# Patient Record
Sex: FEMALE | Race: BLACK OR AFRICAN AMERICAN | NOT HISPANIC OR LATINO | Employment: PART TIME | URBAN - METROPOLITAN AREA
[De-identification: names, ages, dates, MRNs, and addresses within clinical notes are randomized per-mention and may not be internally consistent; named-entity substitution may affect disease eponyms.]

---
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
## 2022-06-10 ENCOUNTER — OFFICE VISIT (OUTPATIENT)
Dept: FAMILY MEDICINE | Facility: CLINIC | Age: 22
End: 2022-06-10
Payer: COMMERCIAL

## 2022-06-10 VITALS
DIASTOLIC BLOOD PRESSURE: 60 MMHG | HEART RATE: 80 BPM | SYSTOLIC BLOOD PRESSURE: 104 MMHG | OXYGEN SATURATION: 99 % | HEIGHT: 62 IN | WEIGHT: 198 LBS | BODY MASS INDEX: 36.44 KG/M2

## 2022-06-10 DIAGNOSIS — Z00.00 ROUTINE GENERAL MEDICAL EXAMINATION AT A HEALTH CARE FACILITY: Primary | ICD-10-CM

## 2022-06-10 DIAGNOSIS — E66.01 MORBID OBESITY (H): ICD-10-CM

## 2022-06-10 PROCEDURE — 99395 PREV VISIT EST AGE 18-39: CPT | Performed by: NURSE PRACTITIONER

## 2022-06-10 ASSESSMENT — ENCOUNTER SYMPTOMS
NAUSEA: 0
DIARRHEA: 0
FEVER: 0
NERVOUS/ANXIOUS: 0
DYSURIA: 0
CHILLS: 0
DIZZINESS: 0
HEMATOCHEZIA: 0
HEARTBURN: 0
SHORTNESS OF BREATH: 0
COUGH: 0
ARTHRALGIAS: 0
FREQUENCY: 0
PARESTHESIAS: 0
HEMATURIA: 0
MYALGIAS: 0
CONSTIPATION: 0
EYE PAIN: 0
PALPITATIONS: 0
WEAKNESS: 0
JOINT SWELLING: 0
BREAST MASS: 0
SORE THROAT: 0
ABDOMINAL PAIN: 0
HEADACHES: 0

## 2022-06-10 NOTE — PROGRESS NOTES
SUBJECTIVE:   CC: Brooke Murcia is an 22 year old woman who presents for preventive health visit.     Patient is living at home with her foster parents.  She just finished a 2-year program at [employer].  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  She is also working part-time at a senior living facility.    Patient denies any chronic medical conditions.  She is not on any regular medications.    She does report that she has episodes of feeling sad and anxious.  This seems to come and go.  She does not wish to discuss this more today.    She is interested in a referral to meet with a dietitian.  She is morbidly obese.  She had labs completed almost 2 years ago, which were unremarkable for diabetes or high cholesterol.  She generally eats well-rounded, but there is room for improvement.  Not exercising regularly currently.    Patient has been advised of split billing requirements and indicates understanding: Yes  Healthy Habits:     Getting at least 3 servings of Calcium per day:  Yes    Bi-annual eye exam:  Yes    Dental care twice a year:  NO    Sleep apnea or symptoms of sleep apnea:  None    Diet:  Regular (no restrictions)    Frequency of exercise:  1 day/week    Duration of exercise:  Less than 15 minutes    Taking medications regularly:  Yes    Medication side effects:  None    PHQ-2 Total Score: 1    Additional concerns today:  No      Today's PHQ-2 Score:   PHQ-2 ( 1999 [employer]) 6/10/2022   Q1: Little interest or pleasure in doing things 1   Q2: Feeling down, depressed or hopeless 0   PHQ-2 Score 1   Q1: Little interest or pleasure in doing things Several days   Q2: Feeling down, depressed or hopeless Not at all   PHQ-2 Score 1       Abuse: Current or Past (Physical, Sexual or Emotional) - No  Do you feel safe in your environment? YES    Have you ever done Advance Care Planning? (For example, a Health Directive, POLST, or a discussion with a medical provider or your loved ones  "about your wishes): No, advance care planning information given to patient to review.  Patient declined advance care planning discussion at this time.    Social History     Tobacco Use     Smoking status: Never Smoker     Smokeless tobacco: Never Used     Tobacco comment: pt tried smoking once d/t peer pressure; Foster father smokes outside.   Substance Use Topics     Alcohol use: No     Alcohol/week: 0.0 standard drinks       Alcohol Use 6/10/2022   Prescreen: >3 drinks/day or >7 drinks/week? Not Applicable       Reviewed orders with patient.  Reviewed health maintenance and updated orders accordingly - Yes      Breast Cancer Screening:      History of abnormal Pap smear: NO - under age 21, PAP not appropriate for age     Reviewed and updated as needed this visit by clinical staff   Tobacco  Allergies  Meds  Problems  Med Hx  Surg Hx  Fam Hx            Reviewed and updated as needed this visit by Provider   Tobacco  Allergies  Meds  Problems  Med Hx  Surg Hx  Fam Hx               Review of Systems  Pertinent items in HPI     OBJECTIVE:   /60   Pulse 80   Ht 1.577 m (5' 2.1\")   Wt 89.8 kg (198 lb)   LMP 05/25/2022 (Approximate)   SpO2 99%   BMI 36.10 kg/m    Physical Exam  GENERAL: healthy, alert and no distress  EYES: Eyes grossly normal to inspection and strabismus  HENT: ear canals and TM's normal, nose and mouth without ulcers or lesions  NECK: no adenopathy, no asymmetry, masses, or scars and thyroid normal to palpation  RESP: lungs clear to auscultation - no rales, rhonchi or wheezes  CV: regular rate and rhythm, normal S1 S2, no S3 or S4, no murmur, click or rub, no peripheral edema  ABDOMEN: soft, nontender, no hepatosplenomegaly, no masses and bowel sounds normal  MS: no gross musculoskeletal defects noted, no edema  SKIN: no suspicious lesions or rashes  NEURO: Normal strength and tone, mentation intact and speech normal  PSYCH: mentation appears normal, affect " "normal/bright      ASSESSMENT/PLAN:   Brooke was seen today for establish care.    Diagnoses and all orders for this visit:    Routine general medical examination at a health care facility    Morbid obesity (H)  -     Comprehensive Weight Management; Future    Other orders  -     REVIEW OF HEALTH MAINTENANCE PROTOCOL ORDERS          COUNSELING:  Reviewed preventive health counseling, as reflected in patient instructions    Estimated body mass index is 36.1 kg/m  as calculated from the following:    Height as of this encounter: 1.577 m (5' 2.1\").    Weight as of this encounter: 89.8 kg (198 lb).    Weight management plan: Patient referred to endocrine and/or weight management specialty Discussed healthy diet and exercise guidelines    She reports that she has never smoked. She has never used smokeless tobacco.        Nicole Mckinnon NP  [facility]  "

## 2022-06-15 ENCOUNTER — TELEPHONE (OUTPATIENT)
Dept: ENDOCRINOLOGY | Facility: CLINIC | Age: 22
End: 2022-06-15
Payer: COMMERCIAL

## 2022-06-15 NOTE — TELEPHONE ENCOUNTER
----- Message from Lien Peraza RD sent at 6/14/2022  4:32 PM CDT -----  Regarding: RE: New MWM program referral  Scheduling team - could you please reach out to pt to get her set up with an initial MWM visit for MD and RD.    Thanks for the heads Lo!    Lien  ----- Message -----  From: Lo Coyne RD  Sent: 6/14/2022   3:23 PM CDT  To: Lien Peraza RD  Subject: New MWM program referral                         León Emmanuel,     This patient just called me to set up an appt with an RD.  She is not sure how she got my number.   She was referred to Comprehensive Weight Management Program on 6/10/22 by Nicole Mckinnon NP.   I explained to her who I am and what I can provide her vs what she was referred for. She would like to proceed with weight management program with her referral.     Do you have a contact for someone to reach out to her to get her scheduled?    Thank you!    Lo

## 2022-09-12 ENCOUNTER — TELEPHONE (OUTPATIENT)
Dept: SURGERY | Facility: CLINIC | Age: 22
End: 2022-09-12

## 2022-09-12 NOTE — TELEPHONE ENCOUNTER
KIRA Cox to complete Patient Questionnaires via PastBook before visit with  on 9/13/2022     Malina Leon

## 2022-09-13 ENCOUNTER — OFFICE VISIT (OUTPATIENT)
Dept: SURGERY | Facility: CLINIC | Age: 22
End: 2022-09-13
Attending: NURSE PRACTITIONER
Payer: COMMERCIAL

## 2022-09-13 VITALS
WEIGHT: 200.9 LBS | DIASTOLIC BLOOD PRESSURE: 64 MMHG | BODY MASS INDEX: 36.97 KG/M2 | SYSTOLIC BLOOD PRESSURE: 118 MMHG | HEIGHT: 62 IN

## 2022-09-13 DIAGNOSIS — R53.83 OTHER FATIGUE: ICD-10-CM

## 2022-09-13 DIAGNOSIS — E66.01 MORBID OBESITY (H): Primary | ICD-10-CM

## 2022-09-13 PROCEDURE — 99214 OFFICE O/P EST MOD 30 MIN: CPT | Performed by: FAMILY MEDICINE

## 2022-09-13 NOTE — LETTER
"    2022         RE: Brooke Murcia  [address]  [place] [address]        Dear Colleague,    Thank you for referring your patient, Brooke Murcia, to the [facility] [place]. Please see a copy of my visit note below.        New Medical Weight Management Consult    PATIENT:  Brooke Murcia  MRN:         [id number]  :         2000  CODIE:         2022    Dear NP Nicole Mckinnon,    I had the pleasure of seeing your patient, Brooke Murcia. Full intake/assessment was done to determine barriers to weight loss success and develop a treatment plan. Brooke Murcia is a 22 year old female interested in treatment of medical problems associated with excess weight. She has a height of 5' 2\", a weight of 200 lbs 14.4 oz, and the calculated Body mass index is 36.75 kg/m .    ASSESSMENT/PLAN:  Some soda  Snacks  Less than lean protein  Inconsistent physical activity    Dietitian  Labs  Discussed taking a walk a day whether outside, mall, or other types of \"move\" that would be enjoyable for her  Encouraged her to invite her mother to sit in on the dietitian visit as she does the shopping and cooking    We discussed Bariatric Basics including:  -eating 3 meals daily  -eating protein first  -eating slowly, chewing food well  -avoiding/limiting calorie containing beverages  -choosing wheat, not white with breads, crackers, pastas, brittany, bagels, tortillas, rice  -limiting restaurant or cafeteria eating to twice a week or less    We discussed the importance of restorative sleep and stress management in maintaining a healthy weight.    We reviewed medications associated with weight gain.    We discussed insulin resistance and glycemic index as it relates to appetite and weight control.     We discussed the National Weight Control Registry healthy weight maintenance strategies and ways to optimize metabolism.  We discussed the importance of physical activity " "including cardiovascular and strength training in maintaining a healthier weight and explored viable options.    We discussed medications available for weight loss including Phentermine, Phendimetrazine, Topamax, Qsymia, Diethylproprion, Orlistat, Contrave (Bupropion/Naltrexone), Saxenda (Liraglutide), Wegovy (Semaglutide), Dulaglutide, Mounjaro (Tirzepatide) and Vyvanse (for Binge Eating Disorder). We discussed the risks and benefits of each. We discussed indications, contraindications, potential side effects, and estimated costs of each. Literature was provided. Brooke understands that not using a weight loss medication is an option.      She has the following co-morbidities:       9/12/2022   I have the following health issues associated with obesity: None of the above   I have the following symptoms associated with obesity: Fatigue       Patient Goals 9/12/2022   I am interested in having a healthier weight to diminish current health problems: No   I am interested in having a healthier weight in order to prevent future health problems: Yes   I am interested in having a healthier weight in order to have a future surgery: No       Referring Provider 9/12/2022   Please name the provider who referred you to Medical Weight Management.  If you do not know, please answer: \"I Don't Know\". Nicole Ino       Weight History 9/12/2022   How concerned are you about your weight? Very Concerned   Would you describe your weight gain as gradual? Yes   I became overweight: As a Teenager   The following factors have contributed to my weight gain:  Mental Health Issues, Change in Schedule, Eating Wrong Types of Food, Eating Too Much, Lack of Exercise, Genetic (Runs in the Family), Stress, Other   Please list the other factors.  Possibly when I broke my ankle in 2019   I have tried the following methods to lose weight: Watching Portions or Calories, Exercise   My lowest weight since age 20 was: 189   My highest weight since age 20 " was: 204   The most weight I have ever lost was: (lbs) 3   I have the following family history of obesity/being overweight:  My mother is overweight, One or more of my siblings are overweight, Many of my relatives are overweight   Has anyone in your family had weight loss surgery? No   How has your weight changed over the last year?  Gained   How many pounds? 5       Diet Recall Review with Patient 9/12/2022   Do you typically eat breakfast? Yes   If you do eat breakfast, what types of food do you eat? Cereal, yogurt, toast, oatmeal   Do you typically eat lunch? Yes   If you do eat lunch, what types of food do you typically eat?  Leftovers, sandwich, chips, fruit   Do you typically eat supper? Yes   If you do eat supper, what types of food do you typically eat? Burgers/brats, Chicken, Rice, tator tot hotdish, salads on the side, tacos, breakfast   Do you typically eat snacks? Yes   If you do snack, what types of food do you typically eat? chips, popcorn, fruit, sweets   Do you like vegetables?  Yes   Do you drink water? Yes   How many glasses of juice do you drink in a typical day? 0   How many of glasses of milk do you drink in a typical day? 1   If you do drink milk, what type? N/A   How many 8oz glasses of sugar containing drinks such as Cristopher-Aid/sweet tea do you drink in a day? 1   How many cans/bottles of sugar pop/soda/tea/sports drinks do you drink in a day? 0   How many cans/bottles of diet pop/soda/tea or sports drink do you drink in a day? 0   How often do you have a drink of alcohol? Never       Eating Habits 9/12/2022   Generally, my meals include foods like these: bread, pasta, rice, potatoes, corn, crackers, sweet dessert, pop, or juice. A Few Times a Week   Generally, my meals include foods like these: fried meats, brats, burgers, french fries, pizza, cheese, chips, or ice cream. A Few Times a Week   Eat fast food (like McDonal[employer], [employer] [place], Taco Bell). Once a Week   Eat at a buffet or sit-down  restaurant. Less Than Weekly   Eat most of my meals in front of the TV or computer. Less Than Weekly   Often skip meals, eat at random times, have no regular eating times. Less Than Weekly   Rarely sit down for a meal but snack or graze throughout.  Less Than Weekly   Eat extra snacks between meals. A Few Times a Week   Eat most of my food at the end of the day. Never   Eat in the middle of the night or wake up at night to eat. Never   Eat extra snacks to prevent or correct low blood sugar. Never   Eat to prevent acid reflux or stomach pain. Never   Worry about not having enough food to eat. A Few Times a Week   Have you been to the food shelf at least a few times this year? No   I eat when I am depressed. Less Than Weekly   I eat when I am stressed. Once a Week   I eat when I am bored. Almost Everyday   I eat when I am anxious. Less Than Weekly   I eat when I am happy or as a reward. A Few Times a Week   I feel hungry all the time even if I just have eaten. A Few Times a Week   Feeling full is important to me. Once a Week   I finish all the food on my plate even if I am already full. A Few Times a Week   I can't resist eating delicious food or walk past the good food/smell. Less Than Weekly   I eat/snack without noticing that I am eating. Less Than Weekly   I eat when I am preparing the meal. Less Than Weekly   I eat more than usual when I see others eating. A Few Times a Week   I have trouble not eating sweets, ice cream, cookies, or chips if they are around the house. Everyday   I think about food all day. Almost Everyday   What foods, if any, do you crave? Sweets/Candy/Chocolate   Please list any other foods you crave? Starbucks coffee, chocolate, hot cheetos, takis       Amount of Food 9/12/2022   I make myself vomit what I have eaten or use laxatives to get rid of food. Never   I eat a large amount of food, like a loaf of bread, a box of cookies, a pint/quart of ice cream, all at once. Never   I eat a large  amount of food even when I am not hungry. Monthly   I eat rapidly. Almost Everyday   I eat alone because I feel embarrassed and do not want others to see how much I have eaten. Monthly   I eat until I am uncomfortably full. Monthly   I feel bad, disgusted, or guilty after I overeat. Weekly   I make myself vomit what I have eaten or use laxatives to get rid of food. Never       Activity/Exercise History 9/12/2022   How much of a typical 12 hour day do you spend sitting? Most of the Day   How much of a typical 12 hour day do you spend lying down? Half the Day   How much of a typical day do you spend walking/standing? Half the Day   How many hours (not including work) do you spend on the TV/Video Games/Computer/Tablet/Phone? 6 Hours or More   How many times a week are you active for the purpose of exercise? Once a Week   What keeps you from being more active? Lack of Time, Too tired, Unsure What To Do   How many total minutes do you spend doing some activity for the purpose of exercising when you exercise? 15-30 Minutes       PAST MEDICAL HISTORY:  Past Medical History:   Diagnosis Date     Acne vulgaris 05/07/2016     Adjustment disorder with depressed mood 03/09/2016     Dyshidrotic eczema 03/09/2016     Other fatigue      Strabismus 03/09/2016       Work/Social History Reviewed With Patient 9/12/2022   My employment status is: Part-Time, Student   My job is: -senior living   How much of your job is spent on the computer or phone? Less Than 50%   How many hours do you spend commuting to work daily?  5 minutes   What is your marital status? Single   Do you have children? No   Who do you live with?  Siblings (2 sisters, 1 brother) Adoptive Parents   Are they supportive of your health goals? Yes   Who does the food shopping?  Parents, sometimes me       Mental Health History Reviewed With Patient 9/12/2022   Have you ever been physically or sexually abused? No   If yes, do you feel that the abuse is  "affecting your weight? No   If yes, would you like to talk to a counselor about the abuse? No   How often in the past 2 weeks have you felt little interest or pleasure in doing things? More Than Half the Days   Over the past 2 weeks how often have you felt down, depressed, or hopeless? For Several Days       Sleep History Reviewed With Patient 9/12/2022   How many hours do you sleep at night? 8   Do you think that you snore loudly or has anybody ever heard you snore loudly (louder than talking or so loud it can be heard behind a shut door)? No   Has anyone seen or heard you stop breathing during your sleep? No   Do you often feel tired, fatigued, or sleepy during the day? Yes   Do you have a TV/Computer in your bedroom? Yes       MEDICATIONS:   No current outpatient medications on file.       ALLERGIES:   No Known Allergies    PHYSICAL EXAM:  /64 (BP Location: Right arm, Patient Position: Sitting, Cuff Size: Adult Small)   Ht 1.575 m (5' 2\")   Wt 91.1 kg (200 lb 14.4 oz)   BMI 36.75 kg/m     Right exotropia  Mallampati 3+, Neck  Heart Regular  Lungs Clear    FOLLOW-UP:   As above.    TIME: 40 min spent on evaluation, management, counseling, education, & motivational interviewing with greater than 50 % of the total time was spent on counseling and coordinating care    Sincerely,    Ratna Hernandez MD          Again, thank you for allowing me to participate in the care of your patient.        Sincerely,        Ratna Hernandez MD    "

## 2022-09-13 NOTE — PATIENT INSTRUCTIONS
[facility]    Remember to: invite your mom to sit in on the dietitian appointments    -Eat 3 meals a day (not 2, not 5) Chew your food well/SLOW down  -Eat your protein first  -Be a water drinker/Minize liquid calories (no regular pop, no juice) skim or 1% milk OK  -Sleep 7-8 hours each night. Address sleep if problematic  -Stress management is important. Address if problematic  -Move-8000 steps daily Muscle: maintain your muscle mass (strength training 2X/wk)  -Wheat, not white (bread, pasta, crackers, brittany, bagels, tortillas, rice)  -Limit restaurant, cafeteria, take out, drive through to 2 times per week or less  -Minimize caffeine, alcohol, and night-time snacking  -Consider keeping a food diary (i.e. My Fitness Pal, Lose It, or other food tracker)  -Follow up with the dietitian      **Some lean proteins: chicken, turkey, tuna, salmon, crab, fish, shrimp, scallops, lobster, lean cuts of beef and pork, luncheon meats, veggie burgers, beans (black, lima, garbanzo, barker, kidney, refried), chile, cottage cheese, string cheese, other cheese, eggs, tofu, peanut butter, nuts, vegan crumbles, greek yogurt    MEDICATIONS FOR WEIGHT LOSS    PHENTERMINE (Adipex): approved in 1959 for appetite suppression.  It has stimulant effects and cannot be used with Ritalin, Concerta, or other stimulants.  It is not addictive although it's chemically related to amphetamines.  Amphetamines are addictive. The most common side effects are dry mouth, increased energy and concentration, increased pulse, and constipation.  You should not take phentermine if you have glaucoma, hyperthyroidism, or uncontrolled/untreated hypertension.  $24-$30 for 90 tablets    PHENDIMETRAZINE (Bontril): Appetite suppressant/sympathomimetic.  Controlled substance.  Side effects and contraindications similar to phentermine.  $45-$60 for 3 month supply    TOPIRAMATE (Topamax): Anti-seizure medication, also used to prevent migraines.  Side  effects include paresthesia, glaucoma, altered concentration, attention difficulties, memory and speech problems, metabolic acidosis, depression, increase in body temperature and decrease sweating, kidney stones, and weight loss.  Do not take Topamax while taking Depakote as this can cause high ammonia levels.  You must have reliable birth control as Topamax can cause birth defects.  Discontinue slowly to avoid seizure.  Insurance usually covers Topiramate.    QSYMIA (Phentermine + Topamax):  See above information about phentermine and Topamax.  Most common side effects are paresthesia, dizziness, distortion of taste, insomnia, constipation, and dry mouth.  $150-$220 per month    DIETHYLPROPION (Tenuate): Sympathomimetic amine.  Appetite suppressant.  Doses 25 mg before meals or 75 mg per day.  Most common side effects are hypertension, palpitations, EKG changes, and increased seizures in epileptics.  There can be a possible adverse reaction with alcohol.  $70-$90 per 3 months    XENICAL(Orlistat) (Real OTC): Approved in 1999.  A fat-blocker.  It reduces absorption of fat by approximately 30%.  It has beneficial effects on lipid levels.  Side effects include diarrhea, abdominal cramping, fecal incontinence, oily spotting, and flatus with discharge.  Side effects are minimized if the patient limits their dietary fat to no more than 30% of their diet.  Patients must take a multivitamin daily to avoid vitamin D, E, A, and K deficiency.  $120 per month    CONTRAVE (Naltrexone/Bupropion): Approved in 2014.  It is a combination pill including an opioid receptor blocker and a long-standing antidepressant.  Most common side effects include nausea, constipation, headache, vomiting, dizziness, trouble sleeping, dry mouth, and diarrhea.  With all antidepressants watch for mood changes and suicide ideation.  Bupropion has been known to lower the seizure threshold in those prone to seizures.  It should not be used in a patient  with a recent history of bulimia. It has been associated with liver damage from taking higher than recommended doses.  Do not use countrave if you have taken opioid medications or opioid street drugs in the past 7-10 days, if you are currently on opioids, methadone, or if you are pregnant.  Do not use contrave if you have recently stopped using alcohol or benzodiazepines.  Taper off contrave slowly.  Dosing: titrate up to 2 tablets twice daily of the Naltrexone 8 mg/ Bupropion 90 mg tablets.  $200 for 90 tablets    SAXENDA (Liraglutide): A daily injectable (3mg daily) medication used for type 2 diabetes (Victoza). Glucagon-like peptide-1 (GLP-1) agonist. Contraindications include personal or family history of medullary thyroid cancer or MEN type 2. Acute pancreatitis has been observed in patients taking liraglutide. Liraglutide causes C-cell tumors in rats and mice. It is unknown whether liraglutide causes tumors in humans. Start at 0.6mg, increasing the dose weekly up to 3mg.     TRULICITY (Dulaglutide): A weekly injectable (4.5mg weekly) medication used for type 2 diabetes. Glucagon-like peptide-1(GLP-1) agonist. Contraindications include personal or family history of medullary thyroid cancer or MEN type 2. Acute pancreatitis has been observed in patients taking liraglutide. Dulaglutide causes C-cell tumors in rats and mice. It is unknown whether liraglutide causes tumors in humans. Start at 0.75 mg, 1.5 mg, 3.0 mg, to 4.5 mg increasing the dose monthly.      VYVANSE (Lisdexamfetamine dimesylate): a CNS stimulant used to treat ADHD. Indicated for the treatment of moderate to severe Binge Eating Disorder in Adults. Contraindicated in patients with known heart disease, structural abnormalities of the heart, serious heart arrhythmias or unexplained syncope. CNS stimulants such as vyvanse may cause manic or psychotic symptoms in patients with BPAD or pre-existing psychosis. Use with caution in patients with Raynaud's  phenomenon. Most common side effects include dry mouth, insomnia, decreased appetite, increased heart rate, jittery feeling, constipation and anxiety.     WEGOVY (Semaglutide): A weekly injectable (2.4mg weekly) medication used for type 2 diabetes (Ozempic). Glucagon-like peptide-1 (GLP-1) agonist. Contraindications include personal or family history of medullary thyroid cancer or MEN type 2. Acute pancreatitis has been observed in patients taking Semaglutide. Semaglutide causes C-cell tumors in rats and mice. It is unknown whether Semaglutide causes tumors in humans. Start at 0.25mg, increasing to 0.5, 1.0, 1.7 then 2.4 monthly.     MOUNJARO (Tirzepatide): A weekly injectable medication indicated for type 2 diabetes. Glucagon-like-peptide-1 (GLP-1) agonist and Glucose-Dependent Insulinotropic Polypeptide (GIP) agonist. Contraindications include personal or family history of medullary thyroid cancer or MEN type 2. Acute pancreatitis has been observed in patients taking Tirzepatide. Tirzepatide causes C-cell tumors in rats and mice. It is unknown whether Tirzepatide causes tumors in humans. Watch for neck mass, difficulty swallowing, persistent hoarseness, and epigastric abdominal pain. Most common side effects include nausea, diarrhea, vomiting, constipation, dyspepsia, and abdominal pain. Start at 2.5mg, increasing to 5.0, 7.5, 10, 12.5 then 15mg monthly.

## 2022-09-13 NOTE — PROGRESS NOTES
"    New Medical Weight Management Consult    PATIENT:  Brooke Murcia  MRN:         [id number]  :         2000  CODIE:         2022    Dear ROBERT Mckinnon,    I had the pleasure of seeing your patient, Brooke Murcia. Full intake/assessment was done to determine barriers to weight loss success and develop a treatment plan. Brooke Murcia is a 22 year old female interested in treatment of medical problems associated with excess weight. She has a height of 5' 2\", a weight of 200 lbs 14.4 oz, and the calculated Body mass index is 36.75 kg/m .    ASSESSMENT/PLAN:  Some soda  Snacks  Less than lean protein  Inconsistent physical activity    Dietitian  Labs  Discussed taking a walk a day whether outside, mall, or other types of \"move\" that would be enjoyable for her  Encouraged her to invite her mother to sit in on the dietitian visit as she does the shopping and cooking    We discussed Bariatric Basics including:  -eating 3 meals daily  -eating protein first  -eating slowly, chewing food well  -avoiding/limiting calorie containing beverages  -choosing wheat, not white with breads, crackers, pastas, brittany, bagels, tortillas, rice  -limiting restaurant or cafeteria eating to twice a week or less    We discussed the importance of restorative sleep and stress management in maintaining a healthy weight.    We reviewed medications associated with weight gain.    We discussed insulin resistance and glycemic index as it relates to appetite and weight control.     We discussed the National Weight Control Registry healthy weight maintenance strategies and ways to optimize metabolism.  We discussed the importance of physical activity including cardiovascular and strength training in maintaining a healthier weight and explored viable options.    We discussed medications available for weight loss including Phentermine, Phendimetrazine, Topamax, Qsymia, Diethylproprion, Orlistat, Contrave (Bupropion/Naltrexone), " "Saxenda (Liraglutide), Wegovy (Semaglutide), Dulaglutide, Mounjaro (Tirzepatide) and Vyvanse (for Binge Eating Disorder). We discussed the risks and benefits of each. We discussed indications, contraindications, potential side effects, and estimated costs of each. Literature was provided. Brooke understands that not using a weight loss medication is an option.      She has the following co-morbidities:       9/12/2022   I have the following health issues associated with obesity: None of the above   I have the following symptoms associated with obesity: Fatigue       Patient Goals 9/12/2022   I am interested in having a healthier weight to diminish current health problems: No   I am interested in having a healthier weight in order to prevent future health problems: Yes   I am interested in having a healthier weight in order to have a future surgery: No       Referring Provider 9/12/2022   Please name the provider who referred you to Medical Weight Management.  If you do not know, please answer: \"I Don't Know\". Nicole Ino       Weight History 9/12/2022   How concerned are you about your weight? Very Concerned   Would you describe your weight gain as gradual? Yes   I became overweight: As a Teenager   The following factors have contributed to my weight gain:  Mental Health Issues, Change in Schedule, Eating Wrong Types of Food, Eating Too Much, Lack of Exercise, Genetic (Runs in the Family), Stress, Other   Please list the other factors.  Possibly when I broke my ankle in 2019   I have tried the following methods to lose weight: Watching Portions or Calories, Exercise   My lowest weight since age 20 was: 189   My highest weight since age 20 was: 204   The most weight I have ever lost was: (lbs) 3   I have the following family history of obesity/being overweight:  My mother is overweight, One or more of my siblings are overweight, Many of my relatives are overweight   Has anyone in your family had weight loss " surgery? No   How has your weight changed over the last year?  Gained   How many pounds? 5       Diet Recall Review with Patient 9/12/2022   Do you typically eat breakfast? Yes   If you do eat breakfast, what types of food do you eat? Cereal, yogurt, toast, oatmeal   Do you typically eat lunch? Yes   If you do eat lunch, what types of food do you typically eat?  Leftovers, sandwich, chips, fruit   Do you typically eat supper? Yes   If you do eat supper, what types of food do you typically eat? Burgers/brats, Chicken, Rice, tator tot hotdish, salads on the side, tacos, breakfast   Do you typically eat snacks? Yes   If you do snack, what types of food do you typically eat? chips, popcorn, fruit, sweets   Do you like vegetables?  Yes   Do you drink water? Yes   How many glasses of juice do you drink in a typical day? 0   How many of glasses of milk do you drink in a typical day? 1   If you do drink milk, what type? N/A   How many 8oz glasses of sugar containing drinks such as Cristopher-Aid/sweet tea do you drink in a day? 1   How many cans/bottles of sugar pop/soda/tea/sports drinks do you drink in a day? 0   How many cans/bottles of diet pop/soda/tea or sports drink do you drink in a day? 0   How often do you have a drink of alcohol? Never       Eating Habits 9/12/2022   Generally, my meals include foods like these: bread, pasta, rice, potatoes, corn, crackers, sweet dessert, pop, or juice. A Few Times a Week   Generally, my meals include foods like these: fried meats, brats, burgers, french fries, pizza, cheese, chips, or ice cream. A Few Times a Week   Eat fast food (like McDonalds, [employer] [place], Taco Bell). Once a Week   Eat at a buffet or sit-down restaurant. Less Than Weekly   Eat most of my meals in front of the TV or computer. Less Than Weekly   Often skip meals, eat at random times, have no regular eating times. Less Than Weekly   Rarely sit down for a meal but snack or graze throughout.  Less Than Weekly   Eat  extra snacks between meals. A Few Times a Week   Eat most of my food at the end of the day. Never   Eat in the middle of the night or wake up at night to eat. Never   Eat extra snacks to prevent or correct low blood sugar. Never   Eat to prevent acid reflux or stomach pain. Never   Worry about not having enough food to eat. A Few Times a Week   Have you been to the food shelf at least a few times this year? No   I eat when I am depressed. Less Than Weekly   I eat when I am stressed. Once a Week   I eat when I am bored. Almost Everyday   I eat when I am anxious. Less Than Weekly   I eat when I am happy or as a reward. A Few Times a Week   I feel hungry all the time even if I just have eaten. A Few Times a Week   Feeling full is important to me. Once a Week   I finish all the food on my plate even if I am already full. A Few Times a Week   I can't resist eating delicious food or walk past the good food/smell. Less Than Weekly   I eat/snack without noticing that I am eating. Less Than Weekly   I eat when I am preparing the meal. Less Than Weekly   I eat more than usual when I see others eating. A Few Times a Week   I have trouble not eating sweets, ice cream, cookies, or chips if they are around the house. Everyday   I think about food all day. Almost Everyday   What foods, if any, do you crave? Sweets/Candy/Chocolate   Please list any other foods you crave? Starbucks coffee, chocolate, hot cheetos, takis       Amount of Food 9/12/2022   I make myself vomit what I have eaten or use laxatives to get rid of food. Never   I eat a large amount of food, like a loaf of bread, a box of cookies, a pint/quart of ice cream, all at once. Never   I eat a large amount of food even when I am not hungry. Monthly   I eat rapidly. Almost Everyday   I eat alone because I feel embarrassed and do not want others to see how much I have eaten. Monthly   I eat until I am uncomfortably full. Monthly   I feel bad, disgusted, or guilty after I  overeat. Weekly   I make myself vomit what I have eaten or use laxatives to get rid of food. Never       Activity/Exercise History 9/12/2022   How much of a typical 12 hour day do you spend sitting? Most of the Day   How much of a typical 12 hour day do you spend lying down? Half the Day   How much of a typical day do you spend walking/standing? Half the Day   How many hours (not including work) do you spend on the TV/Video Games/Computer/Tablet/Phone? 6 Hours or More   How many times a week are you active for the purpose of exercise? Once a Week   What keeps you from being more active? Lack of Time, Too tired, Unsure What To Do   How many total minutes do you spend doing some activity for the purpose of exercising when you exercise? 15-30 Minutes       PAST MEDICAL HISTORY:  Past Medical History:   Diagnosis Date     Acne vulgaris 05/07/2016     Adjustment disorder with depressed mood 03/09/2016     Dyshidrotic eczema 03/09/2016     Other fatigue      Strabismus 03/09/2016       Work/Social History Reviewed With Patient 9/12/2022   My employment status is: Part-Time, Student   My job is: -senior living   How much of your job is spent on the computer or phone? Less Than 50%   How many hours do you spend commuting to work daily?  5 minutes   What is your marital status? Single   Do you have children? No   Who do you live with?  Siblings (2 sisters, 1 brother) Adoptive Parents   Are they supportive of your health goals? Yes   Who does the food shopping?  Parents, sometimes me       Mental Health History Reviewed With Patient 9/12/2022   Have you ever been physically or sexually abused? No   If yes, do you feel that the abuse is affecting your weight? No   If yes, would you like to talk to a counselor about the abuse? No   How often in the past 2 weeks have you felt little interest or pleasure in doing things? More Than Half the Days   Over the past 2 weeks how often have you felt down, depressed, or  "hopeless? For Several Days       Sleep History Reviewed With Patient 9/12/2022   How many hours do you sleep at night? 8   Do you think that you snore loudly or has anybody ever heard you snore loudly (louder than talking or so loud it can be heard behind a shut door)? No   Has anyone seen or heard you stop breathing during your sleep? No   Do you often feel tired, fatigued, or sleepy during the day? Yes   Do you have a TV/Computer in your bedroom? Yes       MEDICATIONS:   No current outpatient medications on file.       ALLERGIES:   No Known Allergies    PHYSICAL EXAM:  /64 (BP Location: Right arm, Patient Position: Sitting, Cuff Size: Adult Small)   Ht 1.575 m (5' 2\")   Wt 91.1 kg (200 lb 14.4 oz)   BMI 36.75 kg/m     Right exotropia  Mallampati 3+, Neck  Heart Regular  Lungs Clear    FOLLOW-UP:   As above.    TIME: 40 min spent on evaluation, management, counseling, education, & motivational interviewing with greater than 50 % of the total time was spent on counseling and coordinating care    Sincerely,    Ratna Hernandez MD      "

## 2022-09-20 ENCOUNTER — VIRTUAL VISIT (OUTPATIENT)
Dept: SURGERY | Facility: CLINIC | Age: 22
End: 2022-09-20
Payer: COMMERCIAL

## 2022-09-20 DIAGNOSIS — Z71.3 NUTRITIONAL COUNSELING: ICD-10-CM

## 2022-09-20 DIAGNOSIS — E66.09 CLASS 2 OBESITY DUE TO EXCESS CALORIES WITHOUT SERIOUS COMORBIDITY WITH BODY MASS INDEX (BMI) OF 36.0 TO 36.9 IN ADULT: Primary | ICD-10-CM

## 2022-09-20 DIAGNOSIS — E66.812 CLASS 2 OBESITY DUE TO EXCESS CALORIES WITHOUT SERIOUS COMORBIDITY WITH BODY MASS INDEX (BMI) OF 36.0 TO 36.9 IN ADULT: Primary | ICD-10-CM

## 2022-09-20 PROCEDURE — 97802 MEDICAL NUTRITION INDIV IN: CPT | Mod: 95 | Performed by: DIETITIAN, REGISTERED

## 2022-09-20 NOTE — LETTER
9/20/2022         RE: Brooke Murcia  [address]  [place] [address]        Dear Colleague,    Thank you for referring your patient, Brooke Murcia, to the [facility] [place]. Please see a copy of my visit note below.    Brooke Murcia is a 22 year old who is being evaluated via a billable video visit.      How would you like to obtain your AVS? MyChart  If the video visit is dropped, the invitation should be resent by: Send to e-mail at: [email or web address]  Will anyone else be joining your video visit? No      Video Start Time: 7:32 AM      Medical Weight Loss Initial Diet Evaluation  Assessment:  Brooke is presenting today for a new weight management nutrition consultation. Pt has had an initial appointment with Dr. Hernandez.  Weight loss medication: None .       Anthropometrics:    Pt's weight is 200.9 lbs   Initial weight: 200.9 lbs  Weight change: 0  BMI: There is no height or weight on file to calculate BMI.   Ideal body weight: 50.1 kg (110 lb 7.2 oz)  Adjusted ideal body weight: 66.5 kg (146 lb 10.1 oz)    Estimated RMR ([place]-St Jeor equation):  1636 kcals x 1.3 (light active) = 2127 kcals (for weight maintenance)    Recommended Protein Intake: 60-80 grams of protein/day    Medical History:  Patient Active Problem List   Diagnosis     Child in foster care     Dyshidrotic eczema     Adjustment disorder with depressed mood     Strabismus     Acne vulgaris     Overweight, pediatric, BMI 85.0-94.9 percentile for age     Other fatigue      Diabetes: No  HbA1c:  No results found for: HGBA1C    Nutrition History:   Food allergies/intolerances/cultural or religous food customs: No     Vitamins/Mineral Supplementation: None     Dietary Recall:  Breakfast: toast or cereal   Lunch: sandwich (ham and cheese, beasley), apple, carrots   Dinner: burgers, broccoli, cantaloupe   Typical Snacks: chips or popcorn or sweets (on occasion)   Overnight eating: No  Eating  out: 0-1 time/week     Beverages: water, occasional coffee or tea    Exercise: trying to do more walking     Nutrition Diagnosis (PES statement):   Overweight/Obesity (NC 3.3) related to overeating and poor lifestyle habits as evidenced by patient report of excessive energy intake, lack of exercise, and BMI of 36.75 kg/m2.     Nutrition Intervention  1. Food and/or Nutrient Delivery   a. Placed emphasis on importance of developing a healthy meal routine, aiming for 3 meals a day and no snacks.  2. Nutrition Education   a. Discussed with patient how to build a meal: the importance of including a lean/low fat protein at each meal, include a source of vegetables at a minimum of lunch and dinner and limiting carbohydrate intake to <25% per meal.  b. Educated on sources of lean protein, portion sizes, the amount of grams found in each source. Recommend patient to aim for 20-30g protein at each meal.  c. Educated on how to read a food label: keeping total fat <10g and sugar <10g per serving.  d. Discussed the importance of adequate hydration, with emphasis on drinking 64oz of water or zero calorie beverages per day.  3. Nutrition Counseling   a. Encouraged importance of developing routine exercise for health benefits and weight loss.  b. Discussed mindful eating techniques such as eating off smaller places/bowls, taking 20-30 minutes to eat in a calm/relaxed environment without distractions.  Goals established by patient:   1. Focus on protein first, aiming for 20-30 grams of protein/meal, 3 meals/day.   2. Establish an exercise regimen, aiming for 8000 steps/day.   3. Focus on eating slowly, chewing food well (aiming for 20-30 minutes/meal).   Handouts provided:  Quick and Easy Meals  Lean Protein Sources  Snack Ideas    Assessment/Plan:    Pt will follow up in 1 month(s) with dietitian.       Video-Visit Details    Type of service:  Video Visit    Video End Time:7:48 AM    Originating Location (pt. Location):  Home    Distant Location (provider location):  [facility] [place]     Platform used for Video Visit: Mariana Villeda RD        Again, thank you for allowing me to participate in the care of your patient.        Sincerely,        Bonnie Villeda RD

## 2022-09-20 NOTE — PROGRESS NOTES
Brooke Murcia is a 22 year old who is being evaluated via a billable video visit.      How would you like to obtain your AVS? MyChart  If the video visit is dropped, the invitation should be resent by: Send to e-mail at: [email or web address]  Will anyone else be joining your video visit? No      Video Start Time: 7:32 AM      Medical Weight Loss Initial Diet Evaluation  Assessment:  Brooke is presenting today for a new weight management nutrition consultation. Pt has had an initial appointment with Dr. Hernandez.  Weight loss medication: None .       Anthropometrics:    Pt's weight is 200.9 lbs   Initial weight: 200.9 lbs  Weight change: 0  BMI: There is no height or weight on file to calculate BMI.   Ideal body weight: 50.1 kg (110 lb 7.2 oz)  Adjusted ideal body weight: 66.5 kg (146 lb 10.1 oz)    Estimated RMR ([place]-St Jeor equation):  1636 kcals x 1.3 (light active) = 2127 kcals (for weight maintenance)    Recommended Protein Intake: 60-80 grams of protein/day    Medical History:  Patient Active Problem List   Diagnosis     Child in foster care     Dyshidrotic eczema     Adjustment disorder with depressed mood     Strabismus     Acne vulgaris     Overweight, pediatric, BMI 85.0-94.9 percentile for age     Other fatigue      Diabetes: No  HbA1c:  No results found for: HGBA1C    Nutrition History:   Food allergies/intolerances/cultural or religous food customs: No     Vitamins/Mineral Supplementation: None     Dietary Recall:  Breakfast: toast or cereal   Lunch: sandwich (ham and cheese, beasley), apple, carrots   Dinner: burgers, broccoli, cantaloupe   Typical Snacks: chips or popcorn or sweets (on occasion)   Overnight eating: No  Eating out: 0-1 time/week     Beverages: water, occasional coffee or tea    Exercise: trying to do more walking     Nutrition Diagnosis (PES statement):   Overweight/Obesity (NC 3.3) related to overeating and poor lifestyle habits as evidenced by patient report of excessive energy  intake, lack of exercise, and BMI of 36.75 kg/m2.     Nutrition Intervention  1. Food and/or Nutrient Delivery   a. Placed emphasis on importance of developing a healthy meal routine, aiming for 3 meals a day and no snacks.  2. Nutrition Education   a. Discussed with patient how to build a meal: the importance of including a lean/low fat protein at each meal, include a source of vegetables at a minimum of lunch and dinner and limiting carbohydrate intake to <25% per meal.  b. Educated on sources of lean protein, portion sizes, the amount of grams found in each source. Recommend patient to aim for 20-30g protein at each meal.  c. Educated on how to read a food label: keeping total fat <10g and sugar <10g per serving.  d. Discussed the importance of adequate hydration, with emphasis on drinking 64oz of water or zero calorie beverages per day.  3. Nutrition Counseling   a. Encouraged importance of developing routine exercise for health benefits and weight loss.  b. Discussed mindful eating techniques such as eating off smaller places/bowls, taking 20-30 minutes to eat in a calm/relaxed environment without distractions.  Goals established by patient:   1. Focus on protein first, aiming for 20-30 grams of protein/meal, 3 meals/day.   2. Establish an exercise regimen, aiming for 8000 steps/day.   3. Focus on eating slowly, chewing food well (aiming for 20-30 minutes/meal).   Handouts provided:  Quick and Easy Meals  Lean Protein Sources  Snack Ideas    Assessment/Plan:    Pt will follow up in 1 month(s) with dietitian.       Video-Visit Details    Type of service:  Video Visit    Video End Time:7:48 AM    Originating Location (pt. Location): Home    Distant Location (provider location):  [facility] SURGERY [facility] AND BARIATRICS CARE [place]     Platform used for Video Visit: Mariana Villeda RD

## 2022-10-11 ENCOUNTER — VIRTUAL VISIT (OUTPATIENT)
Dept: SURGERY | Facility: CLINIC | Age: 22
End: 2022-10-11
Payer: COMMERCIAL

## 2022-10-11 DIAGNOSIS — E66.09 CLASS 2 OBESITY DUE TO EXCESS CALORIES WITHOUT SERIOUS COMORBIDITY WITH BODY MASS INDEX (BMI) OF 36.0 TO 36.9 IN ADULT: Primary | ICD-10-CM

## 2022-10-11 DIAGNOSIS — Z71.3 NUTRITIONAL COUNSELING: ICD-10-CM

## 2022-10-11 DIAGNOSIS — E66.812 CLASS 2 OBESITY DUE TO EXCESS CALORIES WITHOUT SERIOUS COMORBIDITY WITH BODY MASS INDEX (BMI) OF 36.0 TO 36.9 IN ADULT: Primary | ICD-10-CM

## 2022-10-11 PROCEDURE — 97803 MED NUTRITION INDIV SUBSEQ: CPT | Mod: 95 | Performed by: DIETITIAN, REGISTERED

## 2022-10-11 NOTE — PROGRESS NOTES
Brooke Murcia is a 22 year old who is being evaluated via a billable video visit.      How would you like to obtain your AVS? MyChart  If the video visit is dropped, the invitation should be resent by: Send to e-mail at: [email or web address]  Will anyone else be joining your video visit? No      Video Start Time: 7:35 AM      Medical  Weight Loss Follow-Up Diet Evaluation  Assessment:  Brooke is presenting today for a follow up weight management nutrition consultation. Pt has had an initial appointment with Dr. Hernandez.   Weight loss medication: None.   Pt's weight is 197 lbs  Initial weight: 200.9 lbs  Weight change: 3.9 lbs (down)    No flowsheet data found.  BMI: There is no height or weight on file to calculate BMI.  Ideal body weight: 50.1 kg (110 lb 7.2 oz)  Adjusted ideal body weight: 66.5 kg (146 lb 10.1 oz)    Estimated RMR ([place]-St Jeor equation):   1618 kcals x 1.3 (light active) = 2103 kcals (for weight maintenance)     Recommended Protein Intake: 60-80 grams of protein/day  Patient Active Problem List:  Patient Active Problem List   Diagnosis     Child in foster care     Dyshidrotic eczema     Adjustment disorder with depressed mood     Strabismus     Acne vulgaris     Overweight, pediatric, BMI 85.0-94.9 percentile for age     Other fatigue     Diabetes: No     Progress on goals from last visit: starting to try and walk more.  Patient reports that she has incorporated more fruit into her diet as trying to switch over to wheat vs white bread.      Dietary Recall:  Breakfast: toast with PB, fruit   Lunch:sandwich or leftovers from the night before   Dinner:sausage with onions, sweet potatoes, rice  Typical snacks: chips   Beverages: [place] Coffee 0-1 time/week, water (trying to increase consumption), juice (on occasion)  Exercise: Walking 2 times/week for 10-15 minutes    Nutrition Diagnosis:    Overweight/Obesity (NC 3.3) related to overeating and poor lifestyle habits as evidenced by patient's  subjective statements (excessive energy intake) and BMI of 36.1 kg/m2.      Intervention:  1. Food and/or nutrient delivery: balanced meals, adequate protein   2. Nutrition education: protein options for breakfast, water intake   3. Nutrition counseling: exercise regimen    Monitoring/Evaluation:    Goals:  1. Continue to work on increased exercise, aiming for 3 days/week of walking.   2. Focus on protein first at each meal, aiming towards 20-30 grams of protein/meal, 3 meals/day.   3. Continue to work on increased water consumption, aiming for 64 oz/day.   4. Swap out chips for a healthy snack alternative (I.e. fruit).     Patient to follow up in 1 month(s) with SHIREEN        Video-Visit Details    Type of service:  Video Visit    Video End Time:7:45 AM    Originating Location (pt. Location): Home    Distant Location (provider location):  [facility] SURGERY CLINIC AND BARIATRICS CARE [place]     Platform used for Video Visit: Mariana Villeda RD

## 2022-10-11 NOTE — LETTER
10/11/2022         RE: Brooke Murcia  [address]  [place] [address]        Dear Colleague,    Thank you for referring your patient, Brooke Murcia, to the [facility] SURGERY CLINIC AND BARIATRICS CARE [place]. Please see a copy of my visit note below.    Brooke Murcia is a 22 year old who is being evaluated via a billable video visit.      How would you like to obtain your AVS? MyChart  If the video visit is dropped, the invitation should be resent by: Send to e-mail at: [email or web address]  Will anyone else be joining your video visit? No      Video Start Time: 7:35 AM      Medical  Weight Loss Follow-Up Diet Evaluation  Assessment:  Brooke is presenting today for a follow up weight management nutrition consultation. Pt has had an initial appointment with Dr. Hernandez.   Weight loss medication: None.   Pt's weight is 197 lbs  Initial weight: 200.9 lbs  Weight change: 3.9 lbs (down)    No flowsheet data found.  BMI: There is no height or weight on file to calculate BMI.  Ideal body weight: 50.1 kg (110 lb 7.2 oz)  Adjusted ideal body weight: 66.5 kg (146 lb 10.1 oz)    Estimated RMR ([place]-St Jeor equation):   1618 kcals x 1.3 (light active) = 2103 kcals (for weight maintenance)     Recommended Protein Intake: 60-80 grams of protein/day  Patient Active Problem List:  Patient Active Problem List   Diagnosis     Child in foster care     Dyshidrotic eczema     Adjustment disorder with depressed mood     Strabismus     Acne vulgaris     Overweight, pediatric, BMI 85.0-94.9 percentile for age     Other fatigue     Diabetes: No     Progress on goals from last visit: starting to try and walk more.  Patient reports that she has incorporated more fruit into her diet as trying to switch over to wheat vs white bread.      Dietary Recall:  Breakfast: toast with PB, fruit   Lunch:sandwich or leftovers from the night before   Dinner:sausage with onions, sweet potatoes, rice  Typical snacks: chips    Beverages: [place] Coffee 0-1 time/week, water (trying to increase consumption), juice (on occasion)  Exercise: Walking 2 times/week for 10-15 minutes    Nutrition Diagnosis:    Overweight/Obesity (NC 3.3) related to overeating and poor lifestyle habits as evidenced by patient's subjective statements (excessive energy intake) and BMI of 36.1 kg/m2.      Intervention:  1. Food and/or nutrient delivery: balanced meals, adequate protein   2. Nutrition education: protein options for breakfast, water intake   3. Nutrition counseling: exercise regimen    Monitoring/Evaluation:    Goals:  1. Continue to work on increased exercise, aiming for 3 days/week of walking.   2. Focus on protein first at each meal, aiming towards 20-30 grams of protein/meal, 3 meals/day.   3. Continue to work on increased water consumption, aiming for 64 oz/day.   4. Swap out chips for a healthy snack alternative (I.e. fruit).     Patient to follow up in 1 month(s) with SHIREEN        Video-Visit Details    Type of service:  Video Visit    Video End Time:7:45 AM    Originating Location (pt. Location): Home    Distant Location (provider location):  [facility] SURGERY CLINIC AND BARIATRICS CARE [place]     Platform used for Video Visit: Mariana Villeda RD        Again, thank you for allowing me to participate in the care of your patient.        Sincerely,        Bonnie Villeda RD

## 2022-11-18 ENCOUNTER — VIRTUAL VISIT (OUTPATIENT)
Dept: SURGERY | Facility: CLINIC | Age: 22
End: 2022-11-18
Payer: COMMERCIAL

## 2022-11-18 DIAGNOSIS — Z71.3 NUTRITIONAL COUNSELING: ICD-10-CM

## 2022-11-18 DIAGNOSIS — E66.09 CLASS 2 OBESITY DUE TO EXCESS CALORIES WITHOUT SERIOUS COMORBIDITY WITH BODY MASS INDEX (BMI) OF 36.0 TO 36.9 IN ADULT: Primary | ICD-10-CM

## 2022-11-18 DIAGNOSIS — E66.812 CLASS 2 OBESITY DUE TO EXCESS CALORIES WITHOUT SERIOUS COMORBIDITY WITH BODY MASS INDEX (BMI) OF 36.0 TO 36.9 IN ADULT: Primary | ICD-10-CM

## 2022-11-18 PROCEDURE — 97803 MED NUTRITION INDIV SUBSEQ: CPT | Mod: GT | Performed by: DIETITIAN, REGISTERED

## 2022-11-18 NOTE — LETTER
11/18/2022         RE: Brooke Murcia  [address]  [place] [address]        Dear Colleague,    Thank you for referring your patient, Brooke Murcia, to the [facility] SURGERY CLINIC AND BARIATRICS CARE [place]. Please see a copy of my visit note below.    Brooke Murcia is a 22 year old who is being evaluated via a billable video visit.      How would you like to obtain your AVS? MyChart  If the video visit is dropped, the invitation should be resent by: Send to e-mail at: [email or web address]  Will anyone else be joining your video visit? No        Medical  Weight Loss Follow-Up Diet Evaluation  Assessment:  Brooke is presenting today for a follow up weight management nutrition consultation. Pt has had an initial appointment with Dr. Hernadnez.  Weight loss medication: None .   Pt's weight is 197 lbs  Initial weight: 200.9 lbs   Weight change: 3.9 lbs (down)     No flowsheet data found.  BMI: There is no height or weight on file to calculate BMI.  Ideal body weight: 50.1 kg (110 lb 7.2 oz)  Adjusted ideal body weight: 66.5 kg (146 lb 10.1 oz)    Estimated RMR ([place]-St Jeor equation):   1618 kcals x 1.3 (light active) = 2103 kcals (for weight maintenance)     Recommended Protein Intake: 60-80 grams of protein/day  Patient Active Problem List:  Patient Active Problem List   Diagnosis     Child in foster care     Dyshidrotic eczema     Adjustment disorder with depressed mood     Strabismus     Acne vulgaris     Overweight, pediatric, BMI 85.0-94.9 percentile for age     Other fatigue     Diabetes: No     Progress on goals from last visit: Has been trying to focus on protein at meal times, noting that she finds that breakfast is challenging.  Tries to use whole wheat products vs white whenever possible.   Patient reports that she has been trying to swap out the chips for a healthier option such as fruit (apple or orange) or chips.  Patient reports that she is not consistent with drinking as  much water as she know she should.      Exercise: walking not as frequently.    Nutrition Diagnosis:    Overweight/Obesity (NC 3.3) related to overeating and poor lifestyle habits as evidenced by patient's subjective statements and BMI of 36.1 kg/m2.     Intervention:  1. Food and/or nutrient delivery: balanced meals, adequate protein   2. Nutrition education: breakfast options, water intake  3. Nutrition counseling: exercise regimen    Monitoring/Evaluation:    Goals:  1. Trial of hard boiled eggs for a good protein source for breakfast.   2. Continue to work on increased water consumption, aiming for 64 oz/day.   3. Continue to work on exercise, aiming for walking on the treadmill 10 minutes 2 times/week.     Patient to follow up in 1 month(s) with SHIREEN      Video-Visit Details    Type of service:  Video Visit    Video Start Time (time video started): 7:30 am    Video End Time (time video stopped): 7:40 am    Originating Location (pt. Location): Home        Distant Location (provider location):  Off-site    Mode of Communication:  Video Conference via [facility]    Physician has received verbal consent for a Video Visit from the patient? Yes      Bonnie Villeda RD            Again, thank you for allowing me to participate in the care of your patient.        Sincerely,        Bonnie Villeda RD

## 2022-11-18 NOTE — PROGRESS NOTES
Brooke Murcia is a 22 year old who is being evaluated via a billable video visit.      How would you like to obtain your AVS? MyChart  If the video visit is dropped, the invitation should be resent by: Send to e-mail at: [email or web address]  Will anyone else be joining your video visit? No        Medical  Weight Loss Follow-Up Diet Evaluation  Assessment:  Brooke is presenting today for a follow up weight management nutrition consultation. Pt has had an initial appointment with Dr. Hernandez.  Weight loss medication: None .   Pt's weight is 197 lbs  Initial weight: 200.9 lbs   Weight change: 3.9 lbs (down)     No flowsheet data found.  BMI: There is no height or weight on file to calculate BMI.  Ideal body weight: 50.1 kg (110 lb 7.2 oz)  Adjusted ideal body weight: 66.5 kg (146 lb 10.1 oz)    Estimated RMR ([place]-St Jeor equation):   1618 kcals x 1.3 (light active) = 2103 kcals (for weight maintenance)     Recommended Protein Intake: 60-80 grams of protein/day  Patient Active Problem List:  Patient Active Problem List   Diagnosis     Child in foster care     Dyshidrotic eczema     Adjustment disorder with depressed mood     Strabismus     Acne vulgaris     Overweight, pediatric, BMI 85.0-94.9 percentile for age     Other fatigue     Diabetes: No     Progress on goals from last visit: Has been trying to focus on protein at meal times, noting that she finds that breakfast is challenging.  Tries to use whole wheat products vs white whenever possible.   Patient reports that she has been trying to swap out the chips for a healthier option such as fruit (apple or orange) or chips.  Patient reports that she is not consistent with drinking as much water as she know she should.      Exercise: walking not as frequently.    Nutrition Diagnosis:    Overweight/Obesity (NC 3.3) related to overeating and poor lifestyle habits as evidenced by patient's subjective statements and BMI of 36.1 kg/m2.     Intervention:  1. Food  and/or nutrient delivery: balanced meals, adequate protein   2. Nutrition education: breakfast options, water intake  3. Nutrition counseling: exercise regimen    Monitoring/Evaluation:    Goals:  1. Trial of hard boiled eggs for a good protein source for breakfast.   2. Continue to work on increased water consumption, aiming for 64 oz/day.   3. Continue to work on exercise, aiming for walking on the treadmill 10 minutes 2 times/week.     Patient to follow up in 1 month(s) with RD      Video-Visit Details    Type of service:  Video Visit    Video Start Time (time video started): 7:30 am    Video End Time (time video stopped): 7:40 am    Originating Location (pt. Location): Home        Distant Location (provider location):  Off-site    Mode of Communication:  Video Conference via [facility]    Physician has received verbal consent for a Video Visit from the patient? Yes      Bonnie Villeda RD

## 2022-11-20 ENCOUNTER — HEALTH MAINTENANCE LETTER (OUTPATIENT)
Age: 22
End: 2022-11-20

## 2022-12-19 ENCOUNTER — VIRTUAL VISIT (OUTPATIENT)
Dept: SURGERY | Facility: CLINIC | Age: 22
End: 2022-12-19
Payer: COMMERCIAL

## 2022-12-19 DIAGNOSIS — Z71.3 NUTRITIONAL COUNSELING: ICD-10-CM

## 2022-12-19 DIAGNOSIS — E66.812 CLASS 2 OBESITY DUE TO EXCESS CALORIES WITHOUT SERIOUS COMORBIDITY WITH BODY MASS INDEX (BMI) OF 36.0 TO 36.9 IN ADULT: Primary | ICD-10-CM

## 2022-12-19 DIAGNOSIS — E66.09 CLASS 2 OBESITY DUE TO EXCESS CALORIES WITHOUT SERIOUS COMORBIDITY WITH BODY MASS INDEX (BMI) OF 36.0 TO 36.9 IN ADULT: Primary | ICD-10-CM

## 2022-12-19 PROCEDURE — 97803 MED NUTRITION INDIV SUBSEQ: CPT | Mod: GT | Performed by: DIETITIAN, REGISTERED

## 2022-12-19 NOTE — LETTER
12/19/2022         RE: Brooke Murcia  [address]  [place] [address]        Dear Colleague,    Thank you for referring your patient, Brooke Murcia, to the [facility] SURGERY CLINIC AND BARIATRICS CARE [place]. Please see a copy of my visit note below.    Brooke Murcia is a 22 year old who is being evaluated via a billable video visit.      How would you like to obtain your AVS? MyChart  If the video visit is dropped, the invitation should be resent by: Send to e-mail at: [email or web address]  Will anyone else be joining your video visit? No        Medical  Weight Loss Follow-Up Diet Evaluation  Assessment:  Brooke is presenting today for a follow up weight management nutrition consultation. Pt has had an initial appointment with Dr. Hernandez.   Weight loss medication: None .   Pt's weight is 197 lbs  Initial weight: 200.9 lbs  Weight change: 3.9 lbs (down)    No flowsheet data found.  BMI: There is no height or weight on file to calculate BMI.  Patient weight not recorded    Estimated RMR ([place]-St Jeor equation):   1618 kcals x 1.3 (light active) = 2103 kcals (for weight maintenance)     Recommended Protein Intake: 60-80 grams of protein/day  Patient Active Problem List:  Patient Active Problem List   Diagnosis     Child in foster care     Dyshidrotic eczema     Adjustment disorder with depressed mood     Strabismus     Acne vulgaris     Overweight, pediatric, BMI 85.0-94.9 percentile for age     Other fatigue       Progress on goals from last visit: Trying to work on portion control at meals, noting that she is trying to work on going back for seconds.  Patient also reports that she is working on increased protein intake at meal times as well.     Dietary Recall:  Breakfast: skipped or leftovers from the night before or hard boiled eggs  Lunch:noodles or leftovers   Dinner:spaghetti with a slice of cheesy garlic bread  Typical snacks: occasional popcorn (at the end of the  day)    Beverages: water-continues to work on increased consumption, occasional orange juice, coffee on occasion-2 times/week or less   Exercise: walking 2 times/week (on the treadmill) for at least 20 minutes    Nutrition Diagnosis:    Overweight/Obesity (NC 3.3) related to overeating and poor lifestyle habits as evidenced by patient's subjective statements and BMI of 36.1 kg/m2.       Intervention:  1. Food and/or nutrient delivery: balanced meals, adequate protein   2. Nutrition education: Vegetable consumption, setting goals  3. Nutrition counseling: exercise regimen      Monitoring/Evaluation:    Goals:  1. Work on increased water consumption, aiming for 64 oz/day.   2. Work on increased vegetable consumption, aiming for 2 servings/day.     Patient to follow up in 3 month(s) with SHIREEN      Video-Visit Details    Type of service:  Video Visit    Video Start Time (time video started): 10:00 am     Video End Time (time video stopped): 10:18 am     Originating Location (pt. Location): Home        Distant Location (provider location):  Off-site    Mode of Communication:  Video Conference via [facility]    Physician has received verbal consent for a Video Visit from the patient? Yes      Bonnie Villeda RD            Again, thank you for allowing me to participate in the care of your patient.        Sincerely,        Bonnie Villeda RD

## 2022-12-19 NOTE — PROGRESS NOTES
Brooke Murcia is a 22 year old who is being evaluated via a billable video visit.      How would you like to obtain your AVS? MyChart  If the video visit is dropped, the invitation should be resent by: Send to e-mail at: [email or web address]  Will anyone else be joining your video visit? No        Medical  Weight Loss Follow-Up Diet Evaluation  Assessment:  Brooke is presenting today for a follow up weight management nutrition consultation. Pt has had an initial appointment with Dr. Hernandez.   Weight loss medication: None .   Pt's weight is 197 lbs  Initial weight: 200.9 lbs  Weight change: 3.9 lbs (down)    No flowsheet data found.  BMI: There is no height or weight on file to calculate BMI.  Patient weight not recorded    Estimated RMR ([place]-St Jeor equation):   1618 kcals x 1.3 (light active) = 2103 kcals (for weight maintenance)     Recommended Protein Intake: 60-80 grams of protein/day  Patient Active Problem List:  Patient Active Problem List   Diagnosis     Child in foster care     Dyshidrotic eczema     Adjustment disorder with depressed mood     Strabismus     Acne vulgaris     Overweight, pediatric, BMI 85.0-94.9 percentile for age     Other fatigue       Progress on goals from last visit: Trying to work on portion control at meals, noting that she is trying to work on going back for seconds.  Patient also reports that she is working on increased protein intake at meal times as well.     Dietary Recall:  Breakfast: skipped or leftovers from the night before or hard boiled eggs  Lunch:noodles or leftovers   Dinner:spaghetti with a slice of cheesy garlic bread  Typical snacks: occasional popcorn (at the end of the day)    Beverages: water-continues to work on increased consumption, occasional orange juice, coffee on occasion-2 times/week or less   Exercise: walking 2 times/week (on the treadmill) for at least 20 minutes    Nutrition Diagnosis:    Overweight/Obesity (NC 3.3) related to overeating and  poor lifestyle habits as evidenced by patient's subjective statements and BMI of 36.1 kg/m2.       Intervention:  1. Food and/or nutrient delivery: balanced meals, adequate protein   2. Nutrition education: Vegetable consumption, setting goals  3. Nutrition counseling: exercise regimen      Monitoring/Evaluation:    Goals:  1. Work on increased water consumption, aiming for 64 oz/day.   2. Work on increased vegetable consumption, aiming for 2 servings/day.     Patient to follow up in 3 month(s) with RD      Video-Visit Details    Type of service:  Video Visit    Video Start Time (time video started): 10:00 am     Video End Time (time video stopped): 10:18 am     Originating Location (pt. Location): Home        Distant Location (provider location):  Off-site    Mode of Communication:  Video Conference via [facility]    Physician has received verbal consent for a Video Visit from the patient? Yes      Bonnie Villeda, SHIREEN

## 2023-06-14 ENCOUNTER — OFFICE VISIT (OUTPATIENT)
Dept: FAMILY MEDICINE | Facility: CLINIC | Age: 23
End: 2023-06-14
Payer: COMMERCIAL

## 2023-06-14 VITALS
HEART RATE: 77 BPM | WEIGHT: 187 LBS | OXYGEN SATURATION: 100 % | HEIGHT: 62 IN | SYSTOLIC BLOOD PRESSURE: 118 MMHG | TEMPERATURE: 97 F | BODY MASS INDEX: 34.41 KG/M2 | DIASTOLIC BLOOD PRESSURE: 70 MMHG

## 2023-06-14 DIAGNOSIS — Z00.00 ROUTINE GENERAL MEDICAL EXAMINATION AT A HEALTH CARE FACILITY: Primary | ICD-10-CM

## 2023-06-14 DIAGNOSIS — E66.811 CLASS 1 OBESITY WITHOUT SERIOUS COMORBIDITY WITH BODY MASS INDEX (BMI) OF 34.0 TO 34.9 IN ADULT, UNSPECIFIED OBESITY TYPE: ICD-10-CM

## 2023-06-14 PROCEDURE — 99395 PREV VISIT EST AGE 18-39: CPT | Performed by: NURSE PRACTITIONER

## 2023-06-14 ASSESSMENT — ENCOUNTER SYMPTOMS
COUGH: 0
PARESTHESIAS: 0
WEAKNESS: 0
SHORTNESS OF BREATH: 0
HEADACHES: 0
HEMATOCHEZIA: 0
CONSTIPATION: 0
MYALGIAS: 1
CHILLS: 0
ABDOMINAL PAIN: 0
DIZZINESS: 0
DYSURIA: 0
NAUSEA: 0
FREQUENCY: 0
NERVOUS/ANXIOUS: 0
ARTHRALGIAS: 0
HEMATURIA: 0
PALPITATIONS: 0
JOINT SWELLING: 0
SORE THROAT: 0
HEARTBURN: 0
EYE PAIN: 0
FEVER: 0
DIARRHEA: 0
BREAST MASS: 0

## 2023-06-14 NOTE — PROGRESS NOTES
SUBJECTIVE:   CC: Brooke is an 23 year old who presents for preventive health visit.     Patient finished aesthetician school.  Works at a senior living facility.    Patient has never been sexually active.  We discussed a pap screening today, but patient would like to wait.    She met with weight management last year who helped her with nutrition counseling.  She has lost some weight per our records by adopting a vegetarian/vegan diet.  Is active at work, but would like to start walking more regularly.     Declines labs today.  No concerns or questions.     Healthy Habits:     Getting at least 3 servings of Calcium per day:  NO    Bi-annual eye exam:  Yes    Dental care twice a year:  NO    Sleep apnea or symptoms of sleep apnea:  None    Diet:  Vegetarian/vegan    Frequency of exercise:  1 day/week    Duration of exercise:  Less than 15 minutes    Taking medications regularly:  Yes    Medication side effects:  None    PHQ-2 Total Score: 1    Additional concerns today:  No      Today's PHQ-2 Score:       6/14/2023    10:09 AM   PHQ-2 ( 1999 [employer])   Q1: Little interest or pleasure in doing things 1   Q2: Feeling down, depressed or hopeless 0   PHQ-2 Score 1   Q1: Little interest or pleasure in doing things Several days   Q2: Feeling down, depressed or hopeless Not at all   PHQ-2 Score 1       Social History     Tobacco Use     Smoking status: Never     Smokeless tobacco: Never     Tobacco comments:     pt tried smoking once d/t peer pressure; Foster father smokes outside.   Vaping Use     Vaping status: Not on file   Substance Use Topics     Alcohol use: No     Alcohol/week: 0.0 standard drinks of alcohol           6/14/2023    10:08 AM   Alcohol Use   Prescreen: >3 drinks/day or >7 drinks/week? Not Applicable       Reviewed orders with patient.  Reviewed health maintenance and updated orders accordingly - Yes      Breast Cancer Screening:      History of abnormal Pap smear: NO - age 21-29 PAP every 3 years  "recommended     Reviewed and updated as needed this visit by clinical staff   Tobacco  Allergies  Meds  Problems  Med Hx  Surg Hx  Fam Hx          Reviewed and updated as needed this visit by Provider   Tobacco  Allergies  Meds  Problems  Med Hx  Surg Hx  Fam Hx             Review of Systems   Constitutional: Negative for chills and fever.   HENT: Negative for congestion, ear pain, hearing loss and sore throat.    Eyes: Negative for pain and visual disturbance.   Respiratory: Negative for cough and shortness of breath.    Cardiovascular: Negative for chest pain, palpitations and peripheral edema.   Gastrointestinal: Negative for abdominal pain, constipation, diarrhea, heartburn, hematochezia and nausea.   Breasts:  Negative for tenderness, breast mass and discharge.   Genitourinary: Positive for vaginal discharge. Negative for dysuria, frequency, genital sores, hematuria, pelvic pain, urgency and vaginal bleeding.   Musculoskeletal: Positive for myalgias. Negative for arthralgias and joint swelling.   Skin: Negative for rash.   Neurological: Negative for dizziness, weakness, headaches and paresthesias.   Psychiatric/Behavioral: Negative for mood changes. The patient is not nervous/anxious.         OBJECTIVE:   /70 (BP Location: Left arm, Patient Position: Sitting, Cuff Size: Adult Regular)   Pulse 77   Temp 97  F (36.1  C) (Temporal)   Ht 1.575 m (5' 2\")   Wt 84.8 kg (187 lb)   LMP 05/16/2023 (Approximate)   SpO2 100%   BMI 34.20 kg/m    Physical Exam  GENERAL: healthy, alert and no distress  EYES: Eyes grossly normal to inspection, PERRL and conjunctivae and sclerae normal. Left strabismus.   HENT: ear canals and TM's normal, nose and mouth without ulcers or lesions  NECK: no adenopathy, no asymmetry, masses, or scars and thyroid normal to palpation  RESP: lungs clear to auscultation - no rales, rhonchi or wheezes  CV: regular rate and rhythm, normal S1 S2, no S3 or S4, no murmur, click " "or rub, no peripheral edema  ABDOMEN: soft, nontender, no hepatosplenomegaly, no masses and bowel sounds normal  MS: no gross musculoskeletal defects noted, no edema  SKIN: no suspicious lesions or rashes  NEURO: Normal strength and tone, mentation intact and speech normal  PSYCH: mentation appears normal, affect normal/bright      ASSESSMENT/PLAN:   Brooke was seen today for physical.    Diagnoses and all orders for this visit:    Routine general medical examination at a health care facility    Class 1 obesity without serious comorbidity with body mass index (BMI) of 34.0 to 34.9 in adult, unspecified obesity type  Diet and exercise counseling discussed.         Patient has been advised of split billing requirements and indicates understanding: Yes      COUNSELING:  Reviewed preventive health counseling, as reflected in patient instructions       Regular exercise       Healthy diet/nutrition       Consider Hep C screening for all patients one time for ages 18-79 years      BMI:   Estimated body mass index is 34.2 kg/m  as calculated from the following:    Height as of this encounter: 1.575 m (5' 2\").    Weight as of this encounter: 84.8 kg (187 lb).   Weight management plan: Discussed healthy diet and exercise guidelines      She reports that she has never smoked. She has never used smokeless tobacco.        Nicole Mckinnon NP  [facility]"

## 2023-08-02 ENCOUNTER — MYC MEDICAL ADVICE (OUTPATIENT)
Dept: FAMILY MEDICINE | Facility: CLINIC | Age: 23
End: 2023-08-02
Payer: COMMERCIAL

## 2023-08-03 ENCOUNTER — LAB (OUTPATIENT)
Dept: LAB | Facility: CLINIC | Age: 23
End: 2023-08-03
Payer: COMMERCIAL

## 2023-08-03 DIAGNOSIS — E66.01 MORBID OBESITY (H): ICD-10-CM

## 2023-08-03 LAB
ALBUMIN SERPL BCG-MCNC: 4.6 G/DL (ref 3.5–5.2)
ALP SERPL-CCNC: 42 U/L (ref 35–104)
ALT SERPL W P-5'-P-CCNC: 13 U/L (ref 0–50)
ANION GAP SERPL CALCULATED.3IONS-SCNC: 9 MMOL/L (ref 7–15)
AST SERPL W P-5'-P-CCNC: 20 U/L (ref 0–45)
BILIRUB SERPL-MCNC: 0.2 MG/DL
BUN SERPL-MCNC: 6.3 MG/DL (ref 6–20)
CALCIUM SERPL-MCNC: 9.4 MG/DL (ref 8.6–10)
CHLORIDE SERPL-SCNC: 109 MMOL/L (ref 98–107)
CREAT SERPL-MCNC: 0.77 MG/DL (ref 0.51–0.95)
DEPRECATED HCO3 PLAS-SCNC: 24 MMOL/L (ref 22–29)
ERYTHROCYTE [DISTWIDTH] IN BLOOD BY AUTOMATED COUNT: 12.6 % (ref 10–15)
FERRITIN SERPL-MCNC: 38 NG/ML (ref 6–175)
GFR SERPL CREATININE-BSD FRML MDRD: >90 ML/MIN/1.73M2
GLUCOSE SERPL-MCNC: 95 MG/DL (ref 70–99)
HBA1C MFR BLD: 5.6 % (ref 0–5.6)
HCT VFR BLD AUTO: 34 % (ref 35–47)
HGB BLD-MCNC: 10.8 G/DL (ref 11.7–15.7)
HOLD SPECIMEN: NORMAL
MCH RBC QN AUTO: 29.8 PG (ref 26.5–33)
MCHC RBC AUTO-ENTMCNC: 31.8 G/DL (ref 31.5–36.5)
MCV RBC AUTO: 94 FL (ref 78–100)
PLATELET # BLD AUTO: 276 10E3/UL (ref 150–450)
POTASSIUM SERPL-SCNC: 4.6 MMOL/L (ref 3.4–5.3)
PROT SERPL-MCNC: 7.2 G/DL (ref 6.4–8.3)
PTH-INTACT SERPL-MCNC: 39 PG/ML (ref 15–65)
RBC # BLD AUTO: 3.62 10E6/UL (ref 3.8–5.2)
SODIUM SERPL-SCNC: 142 MMOL/L (ref 136–145)
TSH SERPL DL<=0.005 MIU/L-ACNC: 1.86 UIU/ML (ref 0.3–4.2)
VIT B12 SERPL-MCNC: 479 PG/ML (ref 232–1245)
WBC # BLD AUTO: 4.9 10E3/UL (ref 4–11)

## 2023-08-03 PROCEDURE — 80053 COMPREHEN METABOLIC PANEL: CPT

## 2023-08-03 PROCEDURE — 82607 VITAMIN B-12: CPT

## 2023-08-03 PROCEDURE — 84443 ASSAY THYROID STIM HORMONE: CPT

## 2023-08-03 PROCEDURE — 84425 ASSAY OF VITAMIN B-1: CPT | Mod: 90

## 2023-08-03 PROCEDURE — 36415 COLL VENOUS BLD VENIPUNCTURE: CPT

## 2023-08-03 PROCEDURE — 99000 SPECIMEN HANDLING OFFICE-LAB: CPT

## 2023-08-03 PROCEDURE — 82728 ASSAY OF FERRITIN: CPT

## 2023-08-03 PROCEDURE — 85027 COMPLETE CBC AUTOMATED: CPT

## 2023-08-03 PROCEDURE — 83036 HEMOGLOBIN GLYCOSYLATED A1C: CPT

## 2023-08-03 PROCEDURE — 83970 ASSAY OF PARATHORMONE: CPT

## 2023-08-06 LAB — VIT B1 PYROPHOSHATE BLD-SCNC: 112 NMOL/L

## 2023-08-25 DIAGNOSIS — D64.9 ANEMIA, UNSPECIFIED TYPE: Primary | ICD-10-CM

## 2024-01-17 ENCOUNTER — MYC MEDICAL ADVICE (OUTPATIENT)
Dept: FAMILY MEDICINE | Facility: CLINIC | Age: 24
End: 2024-01-17
Payer: COMMERCIAL

## 2024-08-20 ENCOUNTER — OFFICE VISIT (OUTPATIENT)
Dept: FAMILY MEDICINE | Facility: CLINIC | Age: 24
End: 2024-08-20
Payer: COMMERCIAL

## 2024-08-20 VITALS
BODY MASS INDEX: 33.13 KG/M2 | SYSTOLIC BLOOD PRESSURE: 104 MMHG | WEIGHT: 180 LBS | HEIGHT: 62 IN | OXYGEN SATURATION: 100 % | TEMPERATURE: 97.4 F | DIASTOLIC BLOOD PRESSURE: 60 MMHG | HEART RATE: 84 BPM | RESPIRATION RATE: 14 BRPM

## 2024-08-20 DIAGNOSIS — D64.9 ANEMIA, UNSPECIFIED TYPE: ICD-10-CM

## 2024-08-20 DIAGNOSIS — Z00.00 ROUTINE GENERAL MEDICAL EXAMINATION AT A HEALTH CARE FACILITY: Primary | ICD-10-CM

## 2024-08-20 DIAGNOSIS — Z12.4 CERVICAL CANCER SCREENING: ICD-10-CM

## 2024-08-20 LAB
ERYTHROCYTE [DISTWIDTH] IN BLOOD BY AUTOMATED COUNT: 12.7 % (ref 10–15)
HCT VFR BLD AUTO: 33.2 % (ref 35–47)
HGB BLD-MCNC: 10.5 G/DL (ref 11.7–15.7)
MCH RBC QN AUTO: 29.4 PG (ref 26.5–33)
MCHC RBC AUTO-ENTMCNC: 31.6 G/DL (ref 31.5–36.5)
MCV RBC AUTO: 93 FL (ref 78–100)
PLATELET # BLD AUTO: 306 10E3/UL (ref 150–450)
RBC # BLD AUTO: 3.57 10E6/UL (ref 3.8–5.2)
WBC # BLD AUTO: 6.4 10E3/UL (ref 4–11)

## 2024-08-20 PROCEDURE — 85027 COMPLETE CBC AUTOMATED: CPT | Performed by: NURSE PRACTITIONER

## 2024-08-20 PROCEDURE — 99395 PREV VISIT EST AGE 18-39: CPT | Performed by: NURSE PRACTITIONER

## 2024-08-20 PROCEDURE — 36415 COLL VENOUS BLD VENIPUNCTURE: CPT | Performed by: NURSE PRACTITIONER

## 2024-08-20 SDOH — HEALTH STABILITY: PHYSICAL HEALTH: ON AVERAGE, HOW MANY MINUTES DO YOU ENGAGE IN EXERCISE AT THIS LEVEL?: 10 MIN

## 2024-08-20 SDOH — HEALTH STABILITY: PHYSICAL HEALTH: ON AVERAGE, HOW MANY DAYS PER WEEK DO YOU ENGAGE IN MODERATE TO STRENUOUS EXERCISE (LIKE A BRISK WALK)?: 3 DAYS

## 2024-08-20 ASSESSMENT — PATIENT HEALTH QUESTIONNAIRE - PHQ9
SUM OF ALL RESPONSES TO PHQ QUESTIONS 1-9: 6
10. IF YOU CHECKED OFF ANY PROBLEMS, HOW DIFFICULT HAVE THESE PROBLEMS MADE IT FOR YOU TO DO YOUR WORK, TAKE CARE OF THINGS AT HOME, OR GET ALONG WITH OTHER PEOPLE: SOMEWHAT DIFFICULT
SUM OF ALL RESPONSES TO PHQ QUESTIONS 1-9: 6

## 2024-08-20 ASSESSMENT — SOCIAL DETERMINANTS OF HEALTH (SDOH): HOW OFTEN DO YOU GET TOGETHER WITH FRIENDS OR RELATIVES?: TWICE A WEEK

## 2024-08-20 NOTE — PATIENT INSTRUCTIONS
Patient Education   Preventive Care Advice   This is general advice given by our system to help you stay healthy. However, your care team may have specific advice just for you. Please talk to your care team about your preventive care needs.  Nutrition  Eat 5 or more servings of fruits and vegetables each day.  Try wheat bread, brown rice and whole grain pasta (instead of white bread, rice, and pasta).  Get enough calcium and vitamin D. Check the label on foods and aim for 100% of the RDA (recommended daily allowance).  Lifestyle  Exercise at least 150 minutes each week  (30 minutes a day, 5 days a week).  Do muscle strengthening activities 2 days a week. These help control your weight and prevent disease.  No smoking.  Wear sunscreen to prevent skin cancer.  Have a dental exam and cleaning every 6 months.  Yearly exams  See your health care team every year to talk about:  Any changes in your health.  Any medicines your care team has prescribed.  Preventive care, family planning, and ways to prevent chronic diseases.  Shots (vaccines)   HPV shots (up to age 26), if you've never had them before.  Hepatitis B shots (up to age 59), if you've never had them before.  COVID-19 shot: Get this shot when it's due.  Flu shot: Get a flu shot every year.  Tetanus shot: Get a tetanus shot every 10 years.  Pneumococcal, hepatitis A, and RSV shots: Ask your care team if you need these based on your risk.  Shingles shot (for age 50 and up)  General health tests  Diabetes screening:  Starting at age 35, Get screened for diabetes at least every 3 years.  If you are younger than age 35, ask your care team if you should be screened for diabetes.  Cholesterol test: At age 39, start having a cholesterol test every 5 years, or more often if advised.  Bone density scan (DEXA): At age 50, ask your care team if you should have this scan for osteoporosis (brittle bones).  Hepatitis C: Get tested at least once in your life.  STIs (sexually  transmitted infections)  Before age 24: Ask your care team if you should be screened for STIs.  After age 24: Get screened for STIs if you're at risk. You are at risk for STIs (including HIV) if:  You are sexually active with more than one person.  You don't use condoms every time.  You or a partner was diagnosed with a sexually transmitted infection.  If you are at risk for HIV, ask about PrEP medicine to prevent HIV.  Get tested for HIV at least once in your life, whether you are at risk for HIV or not.  Cancer screening tests  Cervical cancer screening: If you have a cervix, begin getting regular cervical cancer screening tests starting at age 21.  Breast cancer scan (mammogram): If you've ever had breasts, begin having regular mammograms starting at age 40. This is a scan to check for breast cancer.  Colon cancer screening: It is important to start screening for colon cancer at age 45.  Have a colonoscopy test every 10 years (or more often if you're at risk) Or, ask your provider about stool tests like a FIT test every year or Cologuard test every 3 years.  To learn more about your testing options, visit:   .  For help making a decision, visit:   https://bit.ly/ts56981.  Prostate cancer screening test: If you have a prostate, ask your care team if a prostate cancer screening test (PSA) at age 55 is right for you.  Lung cancer screening: If you are a current or former smoker ages 50 to 80, ask your care team if ongoing lung cancer screenings are right for you.  For informational purposes only. Not to replace the advice of your health care provider. Copyright   2023 [facility]. All rights reserved. Clinically reviewed by the [facility]. [employer] [id number] - REV 01/24.  Learning About Depression Screening  What is depression screening?  Depression screening is a way to see if you have depression symptoms. It may be done by a doctor or counselor. It's often part of a routine  "checkup. That's because your mental health is just as important as your physical health.  Depression is a mental health condition that affects how you feel, think, and act. You may:  Have less energy.  Lose interest in your daily activities.  Feel sad and grouchy for a long time.  Depression is very common. It affects people of all ages.  Many things can lead to depression. Some people become depressed after they have a stroke or find out they have a major illness like cancer or heart disease. The death of a loved one or a breakup may lead to depression. It can run in families. Most experts believe that a combination of inherited genes and stressful life events can cause it.  What happens during screening?  You may be asked to fill out a form about your depression symptoms. You and the doctor will discuss your answers. The doctor may ask you more questions to learn more about how you think, act, and feel.  What happens after screening?  If you have symptoms of depression, your doctor will talk to you about your options.  Doctors usually treat depression with medicines or counseling. Often, combining the two works best. Many people don't get help because they think that they'll get over the depression on their own. But people with depression may not get better unless they get treatment.  The cause of depression is not well understood. There may be many factors involved. But if you have depression, it's not your fault.  A serious symptom of depression is thinking about death or suicide. If you or someone you care about talks about this or about feeling hopeless, get help right away.  It's important to know that depression can be treated. Medicine, counseling, and self-care may help.  Where can you learn more?  Go to https://www.[employer]net/patiented  Enter T185 in the search box to learn more about \"Learning About Depression Screening.\"  Current as of: June 24, 2023  Content Version: 14.1 2006-2024 [employer] " [employer].   Care instructions adapted under license by your healthcare professional. If you have questions about a medical condition or this instruction, always ask your healthcare professional. [employer]orporated disclaims any warranty or liability for your use of this information.

## 2024-08-20 NOTE — PROGRESS NOTES
"Preventive Care Visit  [facility]  Nicole Mckinnon NP, Family Medicine  Aug 20, 2024      Assessment & Plan     Routine general medical examination at a health care facility    Cervical cancer screening  Patient did not tolerate pap/pelvic exam secondary to pain.  Will attempt again next year.     Anemia, unspecified type  Previously on iron supplement.  - CBC with platelets              BMI  Estimated body mass index is 33.19 kg/m  as calculated from the following:    Height as of this encounter: 1.568 m (5' 1.75\").    Weight as of this encounter: 81.6 kg (180 lb).   Weight management plan: Discussed healthy diet and exercise guidelines    Counseling  Appropriate preventive services were addressed with this patient via screening, questionnaire, or discussion as appropriate for fall prevention, nutrition, physical activity, Tobacco-use cessation, social engagement, weight loss and cognition.  Checklist reviewing preventive services available has been given to the patient.  Reviewed patient's diet, addressing concerns and/or questions.   She is at risk for lack of exercise and has been provided with information to increase physical activity for the benefit of her well-being.   The patient's PHQ-9 score is consistent with mild depression. She was provided with information regarding depression.         Kole Cox is a 24 year old, presenting for the following:  Physical (Nonfasting )         Health Care Directive  Patient does not have a Health Care Directive or Living Will: Discussed advance care planning with patient; however, patient declined at this time.          8/20/2024   General Health   How would you rate your overall physical health? Good   Feel stress (tense, anxious, or unable to sleep) To some extent      (!) STRESS CONCERN      8/20/2024   Nutrition   Three or more servings of calcium each day? (!) NO   Diet: Vegetarian/vegan    Breakfast skipped   How many servings of " fruit and vegetables per day? (!) 0-1   How many sweetened beverages each day? (!) 3       Multiple values from one day are sorted in reverse-chronological order         8/20/2024   Exercise   Days per week of moderate/strenous exercise 3 days   Average minutes spent exercising at this level 10 min            8/20/2024   Social Factors   Frequency of gathering with friends or relatives Twice a week   Worry food won't last until get money to buy more No   Food not last or not have enough money for food? No   Do you have housing? (Housing is defined as stable permanent housing and does not include staying ouside in a car, in a tent, in an abandoned building, in an overnight shelter, or couch-surfing.) Yes   Are you worried about losing your housing? No   Lack of transportation? No   Unable to get utilities (heat,electricity)? No            8/20/2024   Dental   Dentist two times every year? Yes             Today's PHQ-9 Score:       8/20/2024    12:47 PM   PHQ-9 SCORE   PHQ-9 Total Score MyChart 6 (Mild depression)   PHQ-9 Total Score 6         8/20/2024   Substance Use   Alcohol more than 3/day or more than 7/wk No   Do you use any other substances recreationally? No        Social History     Tobacco Use    Smoking status: Never    Smokeless tobacco: Never    Tobacco comments:     pt tried smoking once d/t peer pressure; Foster father smokes outside.   Substance Use Topics    Alcohol use: No     Alcohol/week: 0.0 standard drinks of alcohol    Drug use: No           8/20/2024   STI Screening   New sexual partner(s) since last STI/HIV test? No        History of abnormal Pap smear: No - age 21-29 PAP every 3 years recommended             8/20/2024   Contraception/Family Planning   Questions about contraception or family planning No           Reviewed and updated as needed this visit by Provider                       Objective    Exam  /60 (BP Location: Right arm, Patient Position: Sitting, Cuff Size: Adult Regular)  "  Pulse 84   Temp 97.4  F (36.3  C) (Temporal)   Resp 14   Ht 1.568 m (5' 1.75\")   Wt 81.6 kg (180 lb)   LMP 08/01/2024 (Within Days)   SpO2 100%   BMI 33.19 kg/m     Estimated body mass index is 33.19 kg/m  as calculated from the following:    Height as of this encounter: 1.568 m (5' 1.75\").    Weight as of this encounter: 81.6 kg (180 lb).    Physical Exam  GENERAL: alert and no distress  EYES: Eyes grossly normal to inspection. Right stabismus  HENT: ear canals and TM's normal, nose and mouth without ulcers or lesions  NECK: no adenopathy, no asymmetry, masses, or scars  RESP: lungs clear to auscultation - no rales, rhonchi or wheezes  BREAST: normal without masses, tenderness or nipple discharge and no palpable axillary masses or adenopathy  CV: regular rate and rhythm, normal S1 S2, no S3 or S4, no murmur, click or rub, no peripheral edema  ABDOMEN: soft, nontender, no hepatosplenomegaly, no masses and bowel sounds normal   (female): normal female external genitalia, normal urethral meatus, normal vaginal mucosa  MS: no gross musculoskeletal defects noted, no edema  SKIN: no suspicious lesions or rashes  NEURO: Normal strength and tone, mentation intact and speech normal  PSYCH: mentation appears normal, affect normal/bright        Signed Electronically by: Nicole Mckinnon NP    "

## 2025-07-21 ENCOUNTER — PATIENT OUTREACH (OUTPATIENT)
Dept: CARE COORDINATION | Facility: CLINIC | Age: 25
End: 2025-07-21
Payer: COMMERCIAL